# Patient Record
Sex: FEMALE | Race: WHITE | NOT HISPANIC OR LATINO | Employment: UNEMPLOYED | URBAN - NONMETROPOLITAN AREA
[De-identification: names, ages, dates, MRNs, and addresses within clinical notes are randomized per-mention and may not be internally consistent; named-entity substitution may affect disease eponyms.]

---

## 2017-05-05 DIAGNOSIS — M25.512 LEFT SHOULDER PAIN, UNSPECIFIED CHRONICITY: Primary | ICD-10-CM

## 2017-05-08 ENCOUNTER — HOSPITAL ENCOUNTER (OUTPATIENT)
Dept: GENERAL RADIOLOGY | Facility: HOSPITAL | Age: 67
Discharge: HOME | End: 2017-05-08
Attending: ORTHOPAEDIC SURGERY | Admitting: ORTHOPAEDIC SURGERY

## 2017-05-08 ENCOUNTER — OFFICE VISIT (OUTPATIENT)
Dept: ORTHOPEDIC SURGERY | Facility: CLINIC | Age: 67
End: 2017-05-08

## 2017-05-08 VITALS — BODY MASS INDEX: 34.53 KG/M2 | HEIGHT: 67 IN | WEIGHT: 220 LBS

## 2017-05-08 DIAGNOSIS — M25.512 LEFT SHOULDER PAIN, UNSPECIFIED CHRONICITY: Primary | ICD-10-CM

## 2017-05-08 DIAGNOSIS — M25.512 LEFT SHOULDER PAIN, UNSPECIFIED CHRONICITY: ICD-10-CM

## 2017-05-08 PROCEDURE — 99213 OFFICE O/P EST LOW 20 MIN: CPT | Performed by: ORTHOPAEDIC SURGERY

## 2017-05-08 PROCEDURE — 73030 X-RAY EXAM OF SHOULDER: CPT | Performed by: RADIOLOGY

## 2017-05-08 PROCEDURE — 73030 X-RAY EXAM OF SHOULDER: CPT

## 2017-05-08 PROCEDURE — 20610 DRAIN/INJ JOINT/BURSA W/O US: CPT | Performed by: ORTHOPAEDIC SURGERY

## 2017-05-08 RX ORDER — PRAVASTATIN SODIUM 10 MG
TABLET ORAL
Refills: 0 | COMMUNITY
Start: 2017-05-03 | End: 2017-07-25

## 2017-05-08 RX ORDER — LIDOCAINE HYDROCHLORIDE 20 MG/ML
2 INJECTION, SOLUTION INFILTRATION; PERINEURAL
Status: COMPLETED | OUTPATIENT
Start: 2017-05-08 | End: 2017-05-08

## 2017-05-08 RX ORDER — DICLOFENAC SODIUM 75 MG/1
75 TABLET, DELAYED RELEASE ORAL EVERY MORNING
COMMUNITY
Start: 2015-08-03 | End: 2017-07-28 | Stop reason: HOSPADM

## 2017-05-08 RX ORDER — BENAZEPRIL HYDROCHLORIDE 40 MG/1
40 TABLET, FILM COATED ORAL EVERY MORNING
COMMUNITY
Start: 2015-08-03

## 2017-05-08 RX ORDER — AMLODIPINE BESYLATE 5 MG/1
5 TABLET ORAL NIGHTLY
COMMUNITY
Start: 2013-11-04

## 2017-05-08 RX ORDER — ISOSORBIDE MONONITRATE 60 MG/1
60 TABLET, EXTENDED RELEASE ORAL EVERY MORNING
COMMUNITY
Start: 2015-08-03

## 2017-05-08 RX ORDER — METHYLPREDNISOLONE ACETATE 40 MG/ML
40 INJECTION, SUSPENSION INTRA-ARTICULAR; INTRALESIONAL; INTRAMUSCULAR; SOFT TISSUE
Status: COMPLETED | OUTPATIENT
Start: 2017-05-08 | End: 2017-05-08

## 2017-05-08 RX ADMIN — METHYLPREDNISOLONE ACETATE 40 MG: 40 INJECTION, SUSPENSION INTRA-ARTICULAR; INTRALESIONAL; INTRAMUSCULAR; SOFT TISSUE at 14:22

## 2017-05-08 RX ADMIN — LIDOCAINE HYDROCHLORIDE 2 ML: 20 INJECTION, SOLUTION INFILTRATION; PERINEURAL at 14:22

## 2017-07-10 ENCOUNTER — OFFICE VISIT (OUTPATIENT)
Dept: ORTHOPEDIC SURGERY | Facility: CLINIC | Age: 67
End: 2017-07-10

## 2017-07-10 VITALS — WEIGHT: 220 LBS | BODY MASS INDEX: 34.53 KG/M2 | HEIGHT: 67 IN

## 2017-07-10 DIAGNOSIS — M75.112 INCOMPLETE TEAR OF LEFT ROTATOR CUFF: ICD-10-CM

## 2017-07-10 DIAGNOSIS — M75.42 IMPINGEMENT SYNDROME, SHOULDER, LEFT: Primary | ICD-10-CM

## 2017-07-10 PROBLEM — E78.5 HLD (HYPERLIPIDEMIA): Status: ACTIVE | Noted: 2017-07-10

## 2017-07-10 PROBLEM — M10.9 GOUT: Status: ACTIVE | Noted: 2017-07-10

## 2017-07-10 PROBLEM — M81.0 OP (OSTEOPOROSIS): Status: ACTIVE | Noted: 2017-07-10

## 2017-07-10 PROBLEM — I10 BP (HIGH BLOOD PRESSURE): Status: ACTIVE | Noted: 2017-07-10

## 2017-07-10 PROBLEM — M17.9 ARTHRITIS OF KNEE, DEGENERATIVE: Status: ACTIVE | Noted: 2017-07-10

## 2017-07-10 PROCEDURE — 20610 DRAIN/INJ JOINT/BURSA W/O US: CPT | Performed by: ORTHOPAEDIC SURGERY

## 2017-07-10 PROCEDURE — 99214 OFFICE O/P EST MOD 30 MIN: CPT | Performed by: ORTHOPAEDIC SURGERY

## 2017-07-10 RX ORDER — CYCLOBENZAPRINE HCL 5 MG
5 TABLET ORAL NIGHTLY
COMMUNITY

## 2017-07-10 RX ORDER — SODIUM CHLORIDE, SODIUM LACTATE, POTASSIUM CHLORIDE, CALCIUM CHLORIDE 600; 310; 30; 20 MG/100ML; MG/100ML; MG/100ML; MG/100ML
75 INJECTION, SOLUTION INTRAVENOUS CONTINUOUS
Status: CANCELLED | OUTPATIENT
Start: 2017-07-27

## 2017-07-10 RX ORDER — SODIUM CHLORIDE 0.9 % (FLUSH) 0.9 %
1-10 SYRINGE (ML) INJECTION AS NEEDED
Status: CANCELLED | OUTPATIENT
Start: 2017-07-27

## 2017-07-10 RX ADMIN — METHYLPREDNISOLONE ACETATE 40 MG: 40 INJECTION, SUSPENSION INTRA-ARTICULAR; INTRALESIONAL; INTRAMUSCULAR; SOFT TISSUE at 16:11

## 2017-07-10 RX ADMIN — LIDOCAINE HYDROCHLORIDE 2 ML: 20 INJECTION, SOLUTION INFILTRATION; PERINEURAL at 16:11

## 2017-07-10 NOTE — PROGRESS NOTES
History and Physical    Patient: Gayathri Infante  YOB: 1950  Date of encounter: 7/12/2017      History of Present Illness:   Gayathri Infante is a 67 y.o. female who returns here today for follow-up of left shoulder pain.  Her previous office visit she received a subacromial cortisone injection with relief for only about 5 weeks.  She continues to complain of significant pain and decreased range of motion.  She has brought with her the MRI for us to review today.    PMH:   Patient Active Problem List   Diagnosis   • Gout   • HLD (hyperlipidemia)   • BP (high blood pressure)   • Arthritis of knee, degenerative   • OP (osteoporosis)     Past Medical History:   Diagnosis Date   • Gout    • High cholesterol    • Hypertension    • Osteoporosis        PSH:  Past Surgical History:   Procedure Laterality Date   • CHOLECYSTECTOMY     • TOTAL HIP ARTHROPLASTY      both       Allergies:     Allergies   Allergen Reactions   • Sulfa Antibiotics        Medications:     Current Outpatient Prescriptions:   •  amLODIPine (NORVASC) 5 MG tablet, Take  by mouth., Disp: , Rfl:   •  aspirin 81 MG tablet, Take 81 mg by mouth Daily., Disp: , Rfl:   •  benazepril (LOTENSIN) 40 MG tablet, Take  by mouth., Disp: , Rfl:   •  cyclobenzaprine (FLEXERIL) 5 MG tablet, Take 5 mg by mouth At Night As Needed for Muscle Spasms., Disp: , Rfl:   •  diclofenac (VOLTAREN) 75 MG EC tablet, Take  by mouth., Disp: , Rfl:   •  isosorbide mononitrate (IMDUR) 60 MG 24 hr tablet, Take  by mouth., Disp: , Rfl:   •  metoprolol tartrate (LOPRESSOR) 25 MG tablet, Take  by mouth., Disp: , Rfl:   •  pravastatin (PRAVACHOL) 10 MG tablet, take 1 tablet by mouth once daily, Disp: , Rfl: 0    Social History:  Social History     Social History   • Marital status:      Spouse name: N/A   • Number of children: N/A   • Years of education: N/A     Occupational History   • Not on file.     Social History Main Topics   • Smoking status: Former Smoker   •  "Smokeless tobacco: Not on file   • Alcohol use No   • Drug use: Not on file   • Sexual activity: Not on file     Other Topics Concern   • Not on file     Social History Narrative       Family History:     Family History   Problem Relation Age of Onset   • Hypertension Mother    • Heart disease Mother    • Cancer Mother    • Osteoporosis Mother    • Heart disease Father    • Hypertension Father    • Hypertension Sister    • Heart disease Sister    • Hypertension Brother    • Heart disease Brother    • Hypertension Maternal Grandmother    • Heart disease Maternal Grandmother    • Hypertension Maternal Grandfather    • Heart disease Maternal Grandfather    • Osteoporosis Maternal Grandfather        Review of Systems:   Review of Systems - General ROS: negative  Psychological ROS: negative  Ophthalmic ROS: negative  ENT ROS: negative  Allergy and Immunology ROS: negative  Hematological and Lymphatic ROS: negative  Respiratory ROS: positive for - shortness of breath  negative for - cough or wheezing  Cardiovascular ROS: positive for - palpitations  negative for - chest pain or edema  Gastrointestinal ROS: no abdominal pain, change in bowel habits, or black or bloody stools  Genito-Urinary ROS: no dysuria, trouble voiding, or hematuria  Musculoskeletal ROS: negative  Neurological ROS: no TIA or stroke symptoms  Dermatological ROS: negative      Physical Exam:  General Appearance:    67 y.o. female  cooperative, in no acute distress.  Alert and oriented x 3,                   Vitals:    07/10/17 1514   Weight: 220 lb (99.8 kg)   Height: 67\" (170.2 cm)          HEENT: Unremarkable      Neck: Supple without lymphadenopathy.      Chest: Clear to ascultation bilaterally. Normal respiratory effort.      Heart: Regular rate and rhythm. No murmurs noted.      Skin:  Warm and dry without any rash.      Musculoskeletal:Examination of the left shoulder reveals tenderness along the lateral acromion. She has forward flexion to " approximately 90°. Beyond this elicits significant pain and she has difficulty doing it. She has good external and internal rotation. She has significant pain with Job's maneuver and external rotation but strength is intact. Negative speed's test. Neurovascular status is intact.    Radiology:     MRI of the left shoulder was reviewed and reveals a tear of the rotator cuff tendon without retraction evidence of impingement.      Assessment    ICD-10-CM ICD-9-CM   1. Impingement syndrome, shoulder, left M75.42 726.2   2. Incomplete tear of left rotator cuff M75.112 840.4       Plan:  A 67-year-old female with impingement of the left shoulder with a partial tear of the rotator cuff tendon.  She only received about 5 weeks of relief from previous injection.  We were able to review her MRI today and there is evidence of significant impingement with a tear.  We proceed with 40 mg of DepoMedrol with lidocaine block into the subacromial space of the left shoulder. We have discussed proceeding with surgical intervention including arthroscopy of the left shoulder with acromioplasty and possible mini open repair of the rotator cuff tendon and possible excision of the lateral clavicle.  We discussed the risks, benefits, and future outcomes of surgery.  She accepts these risks and is agreeable to surgery.  We'll place her on the OR schedule for July 27, 2017.    Written by, Nayely TOLBERT, acting as a scribe for Dr. Christie    Cc Dr. Peters            Large Joint Arthrocentesis  Date/Time: 7/10/2017 4:11 PM  Consent given by: patient  Timeout: Immediately prior to procedure a time out was called to verify the correct patient, procedure, equipment, support staff and site/side marked as required   Supporting Documentation  Indications: pain   Procedure Details  Location: shoulder - L subacromial bursa  Needle size: 25 G  Approach: lateral  Medications administered: 40 mg methylPREDNISolone acetate 40 MG/ML; 2 mL lidocaine  2%  Patient tolerance: patient tolerated the procedure well with no immediate complications

## 2017-07-11 RX ORDER — METHYLPREDNISOLONE ACETATE 40 MG/ML
40 INJECTION, SUSPENSION INTRA-ARTICULAR; INTRALESIONAL; INTRAMUSCULAR; SOFT TISSUE
Status: COMPLETED | OUTPATIENT
Start: 2017-07-10 | End: 2017-07-10

## 2017-07-11 RX ORDER — LIDOCAINE HYDROCHLORIDE 20 MG/ML
2 INJECTION, SOLUTION INFILTRATION; PERINEURAL
Status: COMPLETED | OUTPATIENT
Start: 2017-07-10 | End: 2017-07-10

## 2017-07-12 ENCOUNTER — PREP FOR SURGERY (OUTPATIENT)
Dept: OTHER | Facility: HOSPITAL | Age: 67
End: 2017-07-12

## 2017-07-12 NOTE — H&P
History and Physical    Patient: Gayathri Infante  YOB: 1950  Date of encounter: 7/12/2017      History of Present Illness:   Gayathri Infante is a 67 y.o. female who returns here today for follow-up of left shoulder pain.  Her previous office visit she received a subacromial cortisone injection with relief for only about 5 weeks.  She continues to complain of significant pain and decreased range of motion.  She has brought with her the MRI for us to review today.    PMH:   Patient Active Problem List   Diagnosis   • Gout   • HLD (hyperlipidemia)   • BP (high blood pressure)   • Arthritis of knee, degenerative   • OP (osteoporosis)     Past Medical History:   Diagnosis Date   • Gout    • High cholesterol    • Hypertension    • Osteoporosis        PSH:  Past Surgical History:   Procedure Laterality Date   • CHOLECYSTECTOMY     • TOTAL HIP ARTHROPLASTY      both       Allergies:     Allergies   Allergen Reactions   • Sulfa Antibiotics        Medications:     Current Outpatient Prescriptions:   •  amLODIPine (NORVASC) 5 MG tablet, Take  by mouth., Disp: , Rfl:   •  aspirin 81 MG tablet, Take 81 mg by mouth Daily., Disp: , Rfl:   •  benazepril (LOTENSIN) 40 MG tablet, Take  by mouth., Disp: , Rfl:   •  cyclobenzaprine (FLEXERIL) 5 MG tablet, Take 5 mg by mouth At Night As Needed for Muscle Spasms., Disp: , Rfl:   •  diclofenac (VOLTAREN) 75 MG EC tablet, Take  by mouth., Disp: , Rfl:   •  isosorbide mononitrate (IMDUR) 60 MG 24 hr tablet, Take  by mouth., Disp: , Rfl:   •  metoprolol tartrate (LOPRESSOR) 25 MG tablet, Take  by mouth., Disp: , Rfl:   •  pravastatin (PRAVACHOL) 10 MG tablet, take 1 tablet by mouth once daily, Disp: , Rfl: 0    Social History:  Social History     Social History   • Marital status:      Spouse name: N/A   • Number of children: N/A   • Years of education: N/A     Occupational History   • Not on file.     Social History Main Topics   • Smoking status: Former Smoker   •  "Smokeless tobacco: Not on file   • Alcohol use No   • Drug use: Not on file   • Sexual activity: Not on file     Other Topics Concern   • Not on file     Social History Narrative       Family History:     Family History   Problem Relation Age of Onset   • Hypertension Mother    • Heart disease Mother    • Cancer Mother    • Osteoporosis Mother    • Heart disease Father    • Hypertension Father    • Hypertension Sister    • Heart disease Sister    • Hypertension Brother    • Heart disease Brother    • Hypertension Maternal Grandmother    • Heart disease Maternal Grandmother    • Hypertension Maternal Grandfather    • Heart disease Maternal Grandfather    • Osteoporosis Maternal Grandfather        Review of Systems:   Review of Systems - General ROS: negative  Psychological ROS: negative  Ophthalmic ROS: negative  ENT ROS: negative  Allergy and Immunology ROS: negative  Hematological and Lymphatic ROS: negative  Respiratory ROS: positive for - shortness of breath  negative for - cough or wheezing  Cardiovascular ROS: positive for - palpitations  negative for - chest pain or edema  Gastrointestinal ROS: no abdominal pain, change in bowel habits, or black or bloody stools  Genito-Urinary ROS: no dysuria, trouble voiding, or hematuria  Musculoskeletal ROS: negative  Neurological ROS: no TIA or stroke symptoms  Dermatological ROS: negative      Physical Exam:  General Appearance:    67 y.o. female  cooperative, in no acute distress.  Alert and oriented x 3,                   Vitals:    07/10/17 1514   Weight: 220 lb (99.8 kg)   Height: 67\" (170.2 cm)          HEENT: Unremarkable      Neck: Supple without lymphadenopathy.      Chest: Clear to ascultation bilaterally. Normal respiratory effort.      Heart: Regular rate and rhythm. No murmurs noted.      Skin:  Warm and dry without any rash.      Musculoskeletal:Examination of the left shoulder reveals tenderness along the lateral acromion. She has forward flexion to " approximately 90°. Beyond this elicits significant pain and she has difficulty doing it. She has good external and internal rotation. She has significant pain with Job's maneuver and external rotation but strength is intact. Negative speed's test. Neurovascular status is intact.    Radiology:     MRI of the left shoulder was reviewed and reveals a tear of the rotator cuff tendon without retraction evidence of impingement.    Large Joint Arthrocentesis  Date/Time: 7/10/2017 4:11 PM  Consent given by: patient  Timeout: Immediately prior to procedure a time out was called to verify the correct patient, procedure, equipment, support staff and site/side marked as required   Supporting Documentation  Indications: pain   Procedure Details  Location: shoulder - L subacromial bursa  Needle size: 25 G  Approach: lateral  Medications administered: 40 mg methylPREDNISolone acetate 40 MG/ML; 2 mL lidocaine 2%  Patient tolerance: patient tolerated the procedure well with no immediate complications    Assessment    ICD-10-CM ICD-9-CM   1. Impingement syndrome, shoulder, left M75.42 726.2   2. Incomplete tear of left rotator cuff M75.112 840.4       Plan:  A 67-year-old female with impingement of the left shoulder with a partial tear of the rotator cuff tendon.  She only received about 5 weeks of relief from previous injection.  We were able to review her MRI today and there is evidence of significant impingement with a tear.  We have discussed proceeding with surgical intervention including arthroscopy of the left shoulder with acromioplasty and possible mini open repair of the rotator cuff tendon and possible excision of the lateral clavicle.  We discussed the risks, benefits, and future outcomes of surgery.  She accepts these risks and is agreeable to surgery.  We'll place her on the OR schedule for July 27, 2017.    Written by, Nayely TOLBERT, acting as a scribe for Dr. Shahnaz Peters    This document was signed by  Julio César Christie M.D.  July 12, 2017 3:56 PM

## 2017-07-14 ENCOUNTER — TELEPHONE (OUTPATIENT)
Dept: ORTHOPEDIC SURGERY | Facility: CLINIC | Age: 67
End: 2017-07-14

## 2017-07-25 ENCOUNTER — APPOINTMENT (OUTPATIENT)
Dept: PREADMISSION TESTING | Facility: HOSPITAL | Age: 67
End: 2017-07-25

## 2017-07-25 LAB
ANION GAP SERPL CALCULATED.3IONS-SCNC: 5.9 MMOL/L (ref 3.6–11.2)
BUN BLD-MCNC: 16 MG/DL (ref 7–21)
BUN/CREAT SERPL: 27.1 (ref 7–25)
CALCIUM SPEC-SCNC: 9 MG/DL (ref 7.7–10)
CHLORIDE SERPL-SCNC: 109 MMOL/L (ref 99–112)
CO2 SERPL-SCNC: 26.1 MMOL/L (ref 24.3–31.9)
CREAT BLD-MCNC: 0.59 MG/DL (ref 0.43–1.29)
DEPRECATED RDW RBC AUTO: 42.9 FL (ref 37–54)
ERYTHROCYTE [DISTWIDTH] IN BLOOD BY AUTOMATED COUNT: 13 % (ref 11.5–14.5)
GFR SERPL CREATININE-BSD FRML MDRD: 102 ML/MIN/1.73
GLUCOSE BLD-MCNC: 81 MG/DL (ref 70–110)
HCT VFR BLD AUTO: 38.8 % (ref 37–47)
HGB BLD-MCNC: 12.7 G/DL (ref 12–16)
MCH RBC QN AUTO: 30.6 PG (ref 27–33)
MCHC RBC AUTO-ENTMCNC: 32.7 G/DL (ref 33–37)
MCV RBC AUTO: 93.5 FL (ref 80–94)
OSMOLALITY SERPL CALC.SUM OF ELEC: 281.5 MOSM/KG (ref 273–305)
PLATELET # BLD AUTO: 281 10*3/MM3 (ref 130–400)
PMV BLD AUTO: 11.1 FL (ref 6–10)
POTASSIUM BLD-SCNC: 4.2 MMOL/L (ref 3.5–5.3)
RBC # BLD AUTO: 4.15 10*6/MM3 (ref 4.2–5.4)
SODIUM BLD-SCNC: 141 MMOL/L (ref 135–153)
WBC NRBC COR # BLD: 7.29 10*3/MM3 (ref 4.5–12.5)

## 2017-07-25 PROCEDURE — 93010 ELECTROCARDIOGRAM REPORT: CPT | Performed by: INTERNAL MEDICINE

## 2017-07-25 RX ORDER — PRAVASTATIN SODIUM 10 MG
10 TABLET ORAL NIGHTLY
COMMUNITY

## 2017-07-25 RX ORDER — CHLORAL HYDRATE 500 MG
1000 CAPSULE ORAL
COMMUNITY

## 2017-07-26 ENCOUNTER — TELEPHONE (OUTPATIENT)
Dept: ORTHOPEDIC SURGERY | Facility: CLINIC | Age: 67
End: 2017-07-26

## 2017-07-27 ENCOUNTER — ANESTHESIA (OUTPATIENT)
Dept: PERIOP | Facility: HOSPITAL | Age: 67
End: 2017-07-27

## 2017-07-27 ENCOUNTER — HOSPITAL ENCOUNTER (INPATIENT)
Facility: HOSPITAL | Age: 67
LOS: 1 days | Discharge: HOME | End: 2017-07-28
Attending: ORTHOPAEDIC SURGERY | Admitting: ORTHOPAEDIC SURGERY

## 2017-07-27 ENCOUNTER — ANESTHESIA EVENT (OUTPATIENT)
Dept: PERIOP | Facility: HOSPITAL | Age: 67
End: 2017-07-27

## 2017-07-27 ENCOUNTER — APPOINTMENT (OUTPATIENT)
Dept: GENERAL RADIOLOGY | Facility: HOSPITAL | Age: 67
End: 2017-07-27
Attending: ORTHOPAEDIC SURGERY

## 2017-07-27 DIAGNOSIS — M75.42 IMPINGEMENT SYNDROME, SHOULDER, LEFT: ICD-10-CM

## 2017-07-27 DIAGNOSIS — M75.112 INCOMPLETE TEAR OF LEFT ROTATOR CUFF: ICD-10-CM

## 2017-07-27 PROBLEM — M75.40 IMPINGEMENT SYNDROME, SHOULDER: Status: ACTIVE | Noted: 2017-07-27

## 2017-07-27 PROCEDURE — 94799 UNLISTED PULMONARY SVC/PX: CPT

## 2017-07-27 PROCEDURE — C1713 ANCHOR/SCREW BN/BN,TIS/BN: HCPCS | Performed by: ORTHOPAEDIC SURGERY

## 2017-07-27 PROCEDURE — 25010000002 PROPOFOL 10 MG/ML EMULSION: Performed by: NURSE ANESTHETIST, CERTIFIED REGISTERED

## 2017-07-27 PROCEDURE — 25010000002 ONDANSETRON PER 1 MG: Performed by: NURSE ANESTHETIST, CERTIFIED REGISTERED

## 2017-07-27 PROCEDURE — 25010000002 FENTANYL CITRATE (PF) 100 MCG/2ML SOLUTION: Performed by: NURSE ANESTHETIST, CERTIFIED REGISTERED

## 2017-07-27 PROCEDURE — 0LQ24ZZ REPAIR LEFT SHOULDER TENDON, PERCUTANEOUS ENDOSCOPIC APPROACH: ICD-10-PCS | Performed by: ORTHOPAEDIC SURGERY

## 2017-07-27 PROCEDURE — 23412 REPAIR ROTATOR CUFF CHRONIC: CPT | Performed by: ORTHOPAEDIC SURGERY

## 2017-07-27 PROCEDURE — 25010000003 CEFAZOLIN PER 500 MG: Performed by: ORTHOPAEDIC SURGERY

## 2017-07-27 PROCEDURE — 0RNK4ZZ RELEASE LEFT SHOULDER JOINT, PERCUTANEOUS ENDOSCOPIC APPROACH: ICD-10-PCS | Performed by: ORTHOPAEDIC SURGERY

## 2017-07-27 DEVICE — SYS SUT/ANCH BIOCOMP SPEEDBRIDGE 4.85 12.5: Type: IMPLANTABLE DEVICE | Site: SHOULDER | Status: FUNCTIONAL

## 2017-07-27 RX ORDER — ATORVASTATIN CALCIUM 10 MG/1
10 TABLET, FILM COATED ORAL NIGHTLY
Status: DISCONTINUED | OUTPATIENT
Start: 2017-07-27 | End: 2017-07-28 | Stop reason: HOSPADM

## 2017-07-27 RX ORDER — OXYCODONE HYDROCHLORIDE AND ACETAMINOPHEN 5; 325 MG/1; MG/1
1 TABLET ORAL ONCE AS NEEDED
Status: DISCONTINUED | OUTPATIENT
Start: 2017-07-27 | End: 2017-07-27 | Stop reason: HOSPADM

## 2017-07-27 RX ORDER — SODIUM CHLORIDE 0.9 % (FLUSH) 0.9 %
1-10 SYRINGE (ML) INJECTION AS NEEDED
Status: DISCONTINUED | OUTPATIENT
Start: 2017-07-27 | End: 2017-07-27 | Stop reason: HOSPADM

## 2017-07-27 RX ORDER — IPRATROPIUM BROMIDE AND ALBUTEROL SULFATE 2.5; .5 MG/3ML; MG/3ML
3 SOLUTION RESPIRATORY (INHALATION) ONCE AS NEEDED
Status: DISCONTINUED | OUTPATIENT
Start: 2017-07-27 | End: 2017-07-27 | Stop reason: HOSPADM

## 2017-07-27 RX ORDER — NAPROXEN 250 MG/1
500 TABLET ORAL 2 TIMES DAILY WITH MEALS
Status: DISCONTINUED | OUTPATIENT
Start: 2017-07-27 | End: 2017-07-28 | Stop reason: HOSPADM

## 2017-07-27 RX ORDER — AMLODIPINE BESYLATE 5 MG/1
5 TABLET ORAL NIGHTLY
Status: DISCONTINUED | OUTPATIENT
Start: 2017-07-27 | End: 2017-07-28 | Stop reason: HOSPADM

## 2017-07-27 RX ORDER — FENTANYL CITRATE 50 UG/ML
50 INJECTION, SOLUTION INTRAMUSCULAR; INTRAVENOUS
Status: DISCONTINUED | OUTPATIENT
Start: 2017-07-27 | End: 2017-07-27 | Stop reason: HOSPADM

## 2017-07-27 RX ORDER — LIDOCAINE HYDROCHLORIDE 20 MG/ML
INJECTION, SOLUTION EPIDURAL; INFILTRATION; INTRACAUDAL; PERINEURAL AS NEEDED
Status: DISCONTINUED | OUTPATIENT
Start: 2017-07-27 | End: 2017-07-27 | Stop reason: SURG

## 2017-07-27 RX ORDER — ONDANSETRON 2 MG/ML
4 INJECTION INTRAMUSCULAR; INTRAVENOUS ONCE AS NEEDED
Status: DISCONTINUED | OUTPATIENT
Start: 2017-07-27 | End: 2017-07-27 | Stop reason: HOSPADM

## 2017-07-27 RX ORDER — MAGNESIUM HYDROXIDE 1200 MG/15ML
LIQUID ORAL AS NEEDED
Status: DISCONTINUED | OUTPATIENT
Start: 2017-07-27 | End: 2017-07-27 | Stop reason: HOSPADM

## 2017-07-27 RX ORDER — SODIUM CHLORIDE, SODIUM LACTATE, POTASSIUM CHLORIDE, CALCIUM CHLORIDE 600; 310; 30; 20 MG/100ML; MG/100ML; MG/100ML; MG/100ML
125 INJECTION, SOLUTION INTRAVENOUS CONTINUOUS
Status: DISCONTINUED | OUTPATIENT
Start: 2017-07-27 | End: 2017-07-28 | Stop reason: HOSPADM

## 2017-07-27 RX ORDER — MIDAZOLAM HYDROCHLORIDE 1 MG/ML
2 INJECTION INTRAMUSCULAR; INTRAVENOUS
Status: DISCONTINUED | OUTPATIENT
Start: 2017-07-27 | End: 2017-07-27 | Stop reason: HOSPADM

## 2017-07-27 RX ORDER — ONDANSETRON 4 MG/1
4 TABLET, ORALLY DISINTEGRATING ORAL EVERY 6 HOURS PRN
Status: DISCONTINUED | OUTPATIENT
Start: 2017-07-27 | End: 2017-07-28 | Stop reason: HOSPADM

## 2017-07-27 RX ORDER — PROPOFOL 10 MG/ML
VIAL (ML) INTRAVENOUS AS NEEDED
Status: DISCONTINUED | OUTPATIENT
Start: 2017-07-27 | End: 2017-07-27 | Stop reason: SURG

## 2017-07-27 RX ORDER — NALOXONE HCL 0.4 MG/ML
0.4 VIAL (ML) INJECTION
Status: DISCONTINUED | OUTPATIENT
Start: 2017-07-27 | End: 2017-07-27 | Stop reason: HOSPADM

## 2017-07-27 RX ORDER — MORPHINE SULFATE 10 MG/ML
8 INJECTION INTRAMUSCULAR; INTRAVENOUS; SUBCUTANEOUS
Status: DISCONTINUED | OUTPATIENT
Start: 2017-07-27 | End: 2017-07-27 | Stop reason: HOSPADM

## 2017-07-27 RX ORDER — CYCLOBENZAPRINE HCL 10 MG
5 TABLET ORAL NIGHTLY
Status: DISCONTINUED | OUTPATIENT
Start: 2017-07-27 | End: 2017-07-28 | Stop reason: HOSPADM

## 2017-07-27 RX ORDER — VECURONIUM BROMIDE 1 MG/ML
INJECTION, POWDER, LYOPHILIZED, FOR SOLUTION INTRAVENOUS AS NEEDED
Status: DISCONTINUED | OUTPATIENT
Start: 2017-07-27 | End: 2017-07-27 | Stop reason: SURG

## 2017-07-27 RX ORDER — SODIUM CHLORIDE, SODIUM LACTATE, POTASSIUM CHLORIDE, CALCIUM CHLORIDE 600; 310; 30; 20 MG/100ML; MG/100ML; MG/100ML; MG/100ML
75 INJECTION, SOLUTION INTRAVENOUS CONTINUOUS
Status: DISCONTINUED | OUTPATIENT
Start: 2017-07-27 | End: 2017-07-28 | Stop reason: HOSPADM

## 2017-07-27 RX ORDER — OXYCODONE HYDROCHLORIDE AND ACETAMINOPHEN 5; 325 MG/1; MG/1
1 TABLET ORAL EVERY 4 HOURS PRN
Status: DISCONTINUED | OUTPATIENT
Start: 2017-07-27 | End: 2017-07-28 | Stop reason: HOSPADM

## 2017-07-27 RX ORDER — ASPIRIN 81 MG/1
81 TABLET ORAL DAILY
Status: DISCONTINUED | OUTPATIENT
Start: 2017-07-27 | End: 2017-07-28 | Stop reason: HOSPADM

## 2017-07-27 RX ORDER — ISOSORBIDE MONONITRATE 60 MG/1
60 TABLET, EXTENDED RELEASE ORAL EVERY MORNING
Status: DISCONTINUED | OUTPATIENT
Start: 2017-07-28 | End: 2017-07-28 | Stop reason: HOSPADM

## 2017-07-27 RX ORDER — OXYCODONE AND ACETAMINOPHEN 7.5; 325 MG/1; MG/1
1 TABLET ORAL EVERY 4 HOURS PRN
Status: DISCONTINUED | OUTPATIENT
Start: 2017-07-27 | End: 2017-07-27 | Stop reason: HOSPADM

## 2017-07-27 RX ORDER — LISINOPRIL 10 MG/1
40 TABLET ORAL
Status: DISCONTINUED | OUTPATIENT
Start: 2017-07-28 | End: 2017-07-28 | Stop reason: HOSPADM

## 2017-07-27 RX ORDER — ONDANSETRON 2 MG/ML
INJECTION INTRAMUSCULAR; INTRAVENOUS AS NEEDED
Status: DISCONTINUED | OUTPATIENT
Start: 2017-07-27 | End: 2017-07-27 | Stop reason: SURG

## 2017-07-27 RX ORDER — MEPERIDINE HYDROCHLORIDE 25 MG/ML
12.5 INJECTION INTRAMUSCULAR; INTRAVENOUS; SUBCUTANEOUS
Status: DISCONTINUED | OUTPATIENT
Start: 2017-07-27 | End: 2017-07-27 | Stop reason: HOSPADM

## 2017-07-27 RX ORDER — MIDAZOLAM HYDROCHLORIDE 1 MG/ML
1 INJECTION INTRAMUSCULAR; INTRAVENOUS
Status: DISCONTINUED | OUTPATIENT
Start: 2017-07-27 | End: 2017-07-27 | Stop reason: HOSPADM

## 2017-07-27 RX ADMIN — ONDANSETRON 4 MG: 2 INJECTION, SOLUTION INTRAMUSCULAR; INTRAVENOUS at 07:30

## 2017-07-27 RX ADMIN — OXYCODONE HYDROCHLORIDE AND ACETAMINOPHEN 1 TABLET: 5; 325 TABLET ORAL at 15:41

## 2017-07-27 RX ADMIN — SODIUM CHLORIDE, POTASSIUM CHLORIDE, SODIUM LACTATE AND CALCIUM CHLORIDE: 600; 310; 30; 20 INJECTION, SOLUTION INTRAVENOUS at 07:30

## 2017-07-27 RX ADMIN — ONDANSETRON 4 MG: 4 TABLET, ORALLY DISINTEGRATING ORAL at 18:23

## 2017-07-27 RX ADMIN — OXYCODONE HYDROCHLORIDE AND ACETAMINOPHEN 1 TABLET: 5; 325 TABLET ORAL at 20:05

## 2017-07-27 RX ADMIN — SODIUM CHLORIDE, POTASSIUM CHLORIDE, SODIUM LACTATE AND CALCIUM CHLORIDE 125 ML/HR: 600; 310; 30; 20 INJECTION, SOLUTION INTRAVENOUS at 07:12

## 2017-07-27 RX ADMIN — SODIUM CHLORIDE, POTASSIUM CHLORIDE, SODIUM LACTATE AND CALCIUM CHLORIDE 75 ML/HR: 600; 310; 30; 20 INJECTION, SOLUTION INTRAVENOUS at 18:28

## 2017-07-27 RX ADMIN — VECURONIUM BROMIDE 5 MG: 1 INJECTION, POWDER, LYOPHILIZED, FOR SOLUTION INTRAVENOUS at 07:37

## 2017-07-27 RX ADMIN — CEFAZOLIN 2 G: 1 INJECTION, POWDER, FOR SOLUTION INTRAMUSCULAR; INTRAVENOUS; PARENTERAL at 07:30

## 2017-07-27 RX ADMIN — AMLODIPINE BESYLATE 5 MG: 5 TABLET ORAL at 20:05

## 2017-07-27 RX ADMIN — FENTANYL CITRATE 50 MCG: 50 INJECTION INTRAMUSCULAR; INTRAVENOUS at 09:31

## 2017-07-27 RX ADMIN — CEFAZOLIN 2 G: 1 INJECTION, POWDER, FOR SOLUTION INTRAMUSCULAR; INTRAVENOUS; PARENTERAL at 15:48

## 2017-07-27 RX ADMIN — EPHEDRINE SULFATE 20 MG: 50 INJECTION INTRAMUSCULAR; INTRAVENOUS; SUBCUTANEOUS at 08:37

## 2017-07-27 RX ADMIN — ASPIRIN 81 MG: 81 TABLET ORAL at 15:49

## 2017-07-27 RX ADMIN — FENTANYL CITRATE 50 MCG: 50 INJECTION INTRAMUSCULAR; INTRAVENOUS at 09:36

## 2017-07-27 RX ADMIN — ATORVASTATIN CALCIUM 10 MG: 10 TABLET, FILM COATED ORAL at 20:04

## 2017-07-27 RX ADMIN — CYCLOBENZAPRINE HYDROCHLORIDE 5 MG: 10 TABLET, FILM COATED ORAL at 20:04

## 2017-07-27 RX ADMIN — LIDOCAINE HYDROCHLORIDE 100 MG: 20 INJECTION, SOLUTION EPIDURAL; INFILTRATION; INTRACAUDAL; PERINEURAL at 07:37

## 2017-07-27 RX ADMIN — NAPROXEN 500 MG: 250 TABLET ORAL at 17:22

## 2017-07-27 RX ADMIN — OXYCODONE HYDROCHLORIDE AND ACETAMINOPHEN 1 TABLET: 5; 325 TABLET ORAL at 11:31

## 2017-07-27 RX ADMIN — SODIUM CHLORIDE, POTASSIUM CHLORIDE, SODIUM LACTATE AND CALCIUM CHLORIDE: 600; 310; 30; 20 INJECTION, SOLUTION INTRAVENOUS at 08:47

## 2017-07-27 RX ADMIN — METOPROLOL TARTRATE 12.5 MG: 25 TABLET, FILM COATED ORAL at 20:01

## 2017-07-27 RX ADMIN — PROPOFOL 150 MG: 10 INJECTION, EMULSION INTRAVENOUS at 07:37

## 2017-07-27 NOTE — ANESTHESIA PROCEDURE NOTES
Peripheral Block    Patient location during procedure: holding area  Start time: 7/27/2017 7:25 AM  Stop time: 7/27/2017 7:28 AM  Reason for block: procedure for pain and post-op pain management  Performed by  Anesthesiologist: LONG ARREOLA  Preanesthetic Checklist  Completed: patient identified, site marked, surgical consent, pre-op evaluation, timeout performed, IV checked, risks and benefits discussed and monitors and equipment checked  Prep:  Sterile barriers:gloves and mask  Prep: ChloraPrep and alcohol swabs  Patient monitoring: blood pressure monitoring, continuous pulse oximetry and EKG  Procedure  Sedation:yes  Guidance:nerve stimulator and landmark technique  Images:still images not obtained  Loss of twitch: 0.5 mA  Laterality:left  Block Type:interscalene  Injection Technique:single-shot  Needle Type:short-bevel  Needle Gauge:22 G    Medications  Local Injected:ropivacaine 0.5% without epinephrine Local Amount Injected:30mL  Post Assessment  Injection Assessment: negative aspiration for heme, no paresthesia on injection and incremental injection  Patient Tolerance:comfortable throughout block  Complications:no

## 2017-07-27 NOTE — ANESTHESIA POSTPROCEDURE EVALUATION
Patient: Gayathri Infante    Procedure Summary     Date Anesthesia Start Anesthesia Stop Room / Location    07/27/17 0730 0922  [facility] / [facility]       Procedure Diagnosis Surgeon Provider    LEFT SHOULDER ARTHROSCOPY W/ MINI OPEN ROTATOR CUFF REPAIR,ACROMIOPLAST (Left Shoulder) Incomplete tear of left rotator cuff; Impingement syndrome, shoulder, left  (Incomplete tear of left rotator cuff [M75.112]; Impingement syndrome, shoulder, left [M75.42]) MD Brando Diaz MD          Anesthesia Type: general, regional  Last vitals  BP   154/82 (07/27/17 1001)    Temp   97 °F (36.1 °C) (07/27/17 0924)    Pulse   65 (07/27/17 1001)   Resp   13 (07/27/17 1001)    SpO2   99 % (07/27/17 1001)      Post Anesthesia Care and Evaluation    Patient location during evaluation: PACU  Patient participation: complete - patient participated  Level of consciousness: awake and alert  Pain score: 1  Pain management: adequate  Airway patency: patent  Anesthetic complications: No anesthetic complications  PONV Status: controlled  Cardiovascular status: acceptable  Respiratory status: acceptable  Hydration status: acceptable

## 2017-07-27 NOTE — ANESTHESIA PROCEDURE NOTES
Airway  Urgency: elective    Date/Time: 7/27/2017 7:30 AM  Airway not difficult    General Information and Staff    Patient location during procedure: OR  Anesthesiologist: LONG ARREOLA  CRNA: INDIGO [facility]IEGA    Indications and Patient Condition  Indications for airway management: airway protection    Preoxygenated: yes  MILS not maintained throughout  Mask difficulty assessment: 0 - not attempted    Final Airway Details  Final airway type: endotracheal airway      Successful airway: ETT  Cuffed: yes   Successful intubation technique: direct laryngoscopy  Endotracheal tube insertion site: oral  Blade: Ruiz  Blade size: #2  ETT size: 7.5 mm  Cormack-Lehane Classification: grade I - full view of glottis  Placement verified by: chest auscultation and capnometry   Measured from: lips  ETT to lips (cm): 22  Number of attempts at approach: 1    Additional Comments  Atraumatic ETT placement, dentition unchanged.

## 2017-07-27 NOTE — ANESTHESIA PREPROCEDURE EVALUATION
Anesthesia Evaluation     no history of anesthetic complications:  NPO Solid Status: > 8 hours  NPO Liquid Status: > 8 hours     Airway   Mallampati: II  TM distance: >3 FB  Neck ROM: full  no difficulty expected  Dental    (+) upper dentures    Pulmonary - normal exam   Cardiovascular - normal exam    (+) hypertension, hyperlipidemia      Neuro/Psych  GI/Hepatic/Renal/Endo    (+) obesity,      Musculoskeletal     Abdominal  - normal exam   Substance History      OB/GYN          Other   (+) arthritis                                     Anesthesia Plan    ASA 3     general and regional     intravenous induction   Anesthetic plan and risks discussed with patient.

## 2017-07-28 VITALS
HEIGHT: 67 IN | OXYGEN SATURATION: 95 % | HEART RATE: 65 BPM | BODY MASS INDEX: 34.11 KG/M2 | TEMPERATURE: 98 F | DIASTOLIC BLOOD PRESSURE: 68 MMHG | WEIGHT: 217.3 LBS | SYSTOLIC BLOOD PRESSURE: 138 MMHG | RESPIRATION RATE: 18 BRPM

## 2017-07-28 PROCEDURE — 99024 POSTOP FOLLOW-UP VISIT: CPT | Performed by: PHYSICIAN ASSISTANT

## 2017-07-28 PROCEDURE — 25010000003 CEFAZOLIN PER 500 MG: Performed by: ORTHOPAEDIC SURGERY

## 2017-07-28 RX ADMIN — OXYCODONE HYDROCHLORIDE AND ACETAMINOPHEN 1 TABLET: 5; 325 TABLET ORAL at 00:56

## 2017-07-28 RX ADMIN — OXYCODONE HYDROCHLORIDE AND ACETAMINOPHEN 1 TABLET: 5; 325 TABLET ORAL at 09:34

## 2017-07-28 RX ADMIN — ASPIRIN 81 MG: 81 TABLET ORAL at 08:40

## 2017-07-28 RX ADMIN — NAPROXEN 500 MG: 250 TABLET ORAL at 08:40

## 2017-07-28 RX ADMIN — OXYCODONE HYDROCHLORIDE AND ACETAMINOPHEN 1 TABLET: 5; 325 TABLET ORAL at 06:00

## 2017-07-28 RX ADMIN — SODIUM CHLORIDE, POTASSIUM CHLORIDE, SODIUM LACTATE AND CALCIUM CHLORIDE 75 ML/HR: 600; 310; 30; 20 INJECTION, SOLUTION INTRAVENOUS at 08:44

## 2017-07-28 RX ADMIN — ISOSORBIDE MONONITRATE 60 MG: 60 TABLET, EXTENDED RELEASE ORAL at 06:01

## 2017-07-28 RX ADMIN — CEFAZOLIN 2 G: 1 INJECTION, POWDER, FOR SOLUTION INTRAMUSCULAR; INTRAVENOUS; PARENTERAL at 08:39

## 2017-07-28 RX ADMIN — METOPROLOL TARTRATE 12.5 MG: 25 TABLET, FILM COATED ORAL at 08:40

## 2017-07-28 RX ADMIN — CEFAZOLIN 2 G: 1 INJECTION, POWDER, FOR SOLUTION INTRAMUSCULAR; INTRAVENOUS; PARENTERAL at 00:32

## 2017-07-28 RX ADMIN — LISINOPRIL 40 MG: 10 TABLET ORAL at 08:39

## 2017-08-08 DIAGNOSIS — Z98.890 S/P SHOULDER SURGERY: Primary | ICD-10-CM

## 2017-08-09 ENCOUNTER — HOSPITAL ENCOUNTER (OUTPATIENT)
Dept: GENERAL RADIOLOGY | Facility: HOSPITAL | Age: 67
Discharge: HOME | End: 2017-08-09
Attending: ORTHOPAEDIC SURGERY

## 2017-08-09 ENCOUNTER — OFFICE VISIT (OUTPATIENT)
Dept: ORTHOPEDIC SURGERY | Facility: CLINIC | Age: 67
End: 2017-08-09

## 2017-08-09 VITALS — HEIGHT: 67 IN | BODY MASS INDEX: 34.06 KG/M2 | WEIGHT: 217 LBS

## 2017-08-09 DIAGNOSIS — Z98.890 S/P SHOULDER SURGERY: ICD-10-CM

## 2017-08-09 DIAGNOSIS — M75.112 INCOMPLETE TEAR OF LEFT ROTATOR CUFF: ICD-10-CM

## 2017-08-09 DIAGNOSIS — M75.42 IMPINGEMENT SYNDROME, SHOULDER, LEFT: ICD-10-CM

## 2017-08-09 DIAGNOSIS — Z98.890 S/P SHOULDER SURGERY: Primary | ICD-10-CM

## 2017-08-09 PROCEDURE — 99024 POSTOP FOLLOW-UP VISIT: CPT | Performed by: ORTHOPAEDIC SURGERY

## 2017-08-09 NOTE — PROGRESS NOTES
Gayathri Infante   :1950    Date of encounter:2017        HPI:  Gayathri Infante is a 67 y.o. female who returns here today status post left shoulder arthroscopy with mini open repair of the rotator cuff tendon and acromioplasty.  Date of surgery was 2017.  She is now 2 weeks postop.  She states that she's doing reasonably well with very little complaints of pain.  She states is severe pain to his having prior to surgery has subsided.  She does have some mild pain typically at night but overall doing well.  She denies paresthesias.    PMH:   Patient Active Problem List   Diagnosis   • Gout   • HLD (hyperlipidemia)   • BP (high blood pressure)   • Arthritis of knee, degenerative   • OP (osteoporosis)   • Impingement syndrome, shoulder         Exam:   On examination her incisions look good without signs of erythema or drainage.  Her neurovascular status is intact.        Assessment    ICD-10-CM ICD-9-CM   1. S/P shoulder surgery Z98.890 V45.89   2. Impingement syndrome, shoulder, left M75.42 726.2   3. Incomplete tear of left rotator cuff M75.112 840.4       Plan:   A 67-year-old female 2 weeks status post left shoulder arthroscopy with acromioplasty and mini open repair of the rotator cuff tendon.  Her incisions look good today without signs of infection.  Staples were removed.  She is to continue her sling.  We'll get her started in formal physical therapy per protocol.  She'll return back for follow-up in 5 weeks.  She was also given Norco 7.5/325 #24 with no refills for pain.    Written by, Nayely TOLBERT, acting as a scribe for Dr. Shahnaz Peters

## 2017-09-20 ENCOUNTER — OFFICE VISIT (OUTPATIENT)
Dept: ORTHOPEDIC SURGERY | Facility: CLINIC | Age: 67
End: 2017-09-20

## 2017-09-20 VITALS — BODY MASS INDEX: 33.43 KG/M2 | WEIGHT: 213 LBS | HEIGHT: 67 IN

## 2017-09-20 DIAGNOSIS — M75.112 INCOMPLETE TEAR OF LEFT ROTATOR CUFF: ICD-10-CM

## 2017-09-20 DIAGNOSIS — M75.42 IMPINGEMENT SYNDROME, SHOULDER, LEFT: Primary | ICD-10-CM

## 2017-09-20 PROCEDURE — 99024 POSTOP FOLLOW-UP VISIT: CPT | Performed by: ORTHOPAEDIC SURGERY

## 2017-09-20 NOTE — PROGRESS NOTES
"Gayathri Infante   :1950    Date of encounter:2017        HPI:  Gayathri Infante is a 67 y.o. female who returns here today status post left shoulder arthroscopy with acromioplasty and mini open repair of the rotator cuff tendon.  Date of surgery was 17.  She is now 8 weeks postop.  She still complaining of constant daily pain.  She states it's not as severe as it was prior to surgery but is still present.  She's been participating in physical therapy but continues to struggle with stiffness and increased pain.    PMH:   Patient Active Problem List   Diagnosis   • Gout   • HLD (hyperlipidemia)   • BP (high blood pressure)   • Arthritis of knee, degenerative   • OP (osteoporosis)   • Impingement syndrome, shoulder       Exam:  General Appearance:    67 y.o. female  cooperative, in no acute distress.  Alert and oriented x 3,                   Vitals:    17 0814   Weight: 213 lb (96.6 kg)   Height: 67\" (170.2 cm)       Examination of the left shoulder reveals a well-healed incision.  She has forward flexion to about 80°.  She can internally rotate to about the belt line.  She still has weakness with Job's maneuver and external rotation.  Her neurovascular status is intact.      Assessment    ICD-10-CM ICD-9-CM   1. Impingement syndrome, shoulder, left M75.42 726.2   2. Incomplete tear of left rotator cuff M75.112 840.4       Plan:    Still has stiffness and constant pain.  Continue to feel that therapy should be continued to help try to improve her range of motion.  She was provided today with Norco 7.5 #40 for pain.  She'll return back in 6 weeks for reevaluation.    Written by, Nayely TOLBERT, acting as a scribe for Dr. Shahnaz Peters                 "

## 2017-10-11 ENCOUNTER — OFFICE VISIT (OUTPATIENT)
Dept: ORTHOPEDIC SURGERY | Facility: CLINIC | Age: 67
End: 2017-10-11

## 2017-10-11 VITALS — BODY MASS INDEX: 33.43 KG/M2 | HEIGHT: 67 IN | WEIGHT: 213 LBS

## 2017-10-11 DIAGNOSIS — M75.112 INCOMPLETE TEAR OF LEFT ROTATOR CUFF: Primary | ICD-10-CM

## 2017-10-11 DIAGNOSIS — M75.42 IMPINGEMENT SYNDROME, SHOULDER, LEFT: ICD-10-CM

## 2017-10-11 PROCEDURE — 99024 POSTOP FOLLOW-UP VISIT: CPT | Performed by: ORTHOPAEDIC SURGERY

## 2017-10-11 RX ORDER — HYDROCODONE BITARTRATE AND ACETAMINOPHEN 7.5; 325 MG/1; MG/1
TABLET ORAL
Refills: 0 | COMMUNITY
Start: 2017-09-20 | End: 2017-11-29

## 2017-10-12 NOTE — PROGRESS NOTES
"Gayathri Infante   :1950    Date of encounter:10/11/2017        HPI:  Gayathri Infante is a 67 y.o. female who presents here today earlier than scheduled appointment for intractable left shoulder pain.  She is 2 months status post left shoulder arthroscopy with mini open repair of the rotator cuff tendon.  She denies any new injuries.  She states that she has been in physical therapy since her last visit but does not recall any incident at therapy that would cause increased pain.  She is complaining of unbearable constant pain.  She states she cannot get any relief for rest from the pain.   PMH:   Patient Active Problem List   Diagnosis   • Gout   • HLD (hyperlipidemia)   • BP (high blood pressure)   • Arthritis of knee, degenerative   • OP (osteoporosis)   • Impingement syndrome, shoulder       Exam:  General Appearance:    67 y.o. female  cooperative, in no acute distress.  Alert and oriented x 3,                   Vitals:    10/11/17 1520   Weight: 213 lb (96.6 kg)   Height: 67\" (170.2 cm)       Examination left shoulder reveals a well-healed incision.  There is no swelling or ecchymosis.  She has forward flexion and abduction to approximately 80°.  She has little external rotation and can internally rotate to approximately L4 compared to T7 on the contralateral side.  Her neurovascular status is intact.        Assessment    ICD-10-CM ICD-9-CM   1. Incomplete tear of left rotator cuff M75.112 840.4   2. Impingement syndrome, shoulder, left M75.42 726.2       Plan:   A 67-year-old female 2-1/2 months status post left shoulder arthroscopy with mini open repair of the Rotator cuff tendon.  On examination her rotator cuff tendon appears to be intact however she has significant stiffness and we are concerned for component of adhesive capsulitis.  We attempted an intra-articular steroid injection into the glenohumeral joint however we had to stop injection secondary to significant pain.  We have advised to hold off " on physical therapy for the next week and we would like to reevaluate her in a week.    Written by, Nayely TOLBERT, acting as a scribe for Dr. Shahnaz Peters

## 2017-10-18 ENCOUNTER — OFFICE VISIT (OUTPATIENT)
Dept: ORTHOPEDIC SURGERY | Facility: CLINIC | Age: 67
End: 2017-10-18

## 2017-10-18 VITALS — BODY MASS INDEX: 33.43 KG/M2 | WEIGHT: 213 LBS | HEIGHT: 67 IN

## 2017-10-18 DIAGNOSIS — Z98.890 S/P SHOULDER SURGERY: Primary | ICD-10-CM

## 2017-10-18 DIAGNOSIS — M76.892 ADHESIVE CAPSULITIS OF LEFT KNEE: ICD-10-CM

## 2017-10-18 PROCEDURE — 99024 POSTOP FOLLOW-UP VISIT: CPT | Performed by: ORTHOPAEDIC SURGERY

## 2017-10-18 NOTE — PATIENT INSTRUCTIONS
Adhesive Capsulitis  Adhesive capsulitis is inflammation of the tendons and ligaments that surround the shoulder joint (shoulder capsule). This condition causes the shoulder to become stiff and painful to move. Adhesive capsulitis is also called frozen shoulder.  CAUSES  This condition may be caused by:  · An injury to the shoulder joint.  · Straining the shoulder.  · Not moving the shoulder for a period of time. This can happen if your arm was injured or in a sling.  · Long-standing health problems, such as:    Diabetes.    Thyroid problems.    Heart disease.    Stroke.    Rheumatoid arthritis.    Lung disease.  In some cases, the cause may not be known.  RISK FACTORS  This condition is more likely to develop in:  · Women.  · People who are older than 40 years of age.  SYMPTOMS  Symptoms of this condition include:  · Pain in the shoulder when moving the arm. There may also be pain when parts of the shoulder are touched. The pain is worse at night or when at rest.  · Soreness or aching in the shoulder.  · Inability to move the shoulder normally.  · Muscle spasms.  DIAGNOSIS  This condition is diagnosed with a physical exam and imaging tests, such as an X-ray or MRI.  TREATMENT  This condition may be treated with:  · Treatment of the underlying cause or condition.  · Physical therapy. This involves performing exercises to get the shoulder moving again.  · Medicine. Medicine may be given to relieve pain, inflammation, or muscle spasms.  · Steroid injections into the shoulder joint.  · Shoulder manipulation. This is a procedure to move the shoulder into another position. It is done after you are given a medicine to make you fall asleep (general anesthetic). The joint may also be injected with salt water at high pressure to break down scarring.  · Surgery. This may be done in severe cases when other treatments have failed.  Although most people recover completely from adhesive capsulitis, some may not regain the full  movement of the shoulder.  HOME CARE INSTRUCTIONS  · Take over-the-counter and prescription medicines only as told by your health care provider.  · If you are being treated with physical therapy, follow instructions from your physical therapist.  · Avoid exercises that put a lot of demand on your shoulder, such as throwing. These exercises can make pain worse.  · If directed, apply ice to the injured area:    Put ice in a plastic bag.    Place a towel between your skin and the bag.    Leave the ice on for 20 minutes, 2-3 times per day.  SEEK MEDICAL CARE IF:  · You develop new symptoms.  · Your symptoms get worse.     This information is not intended to replace advice given to you by your health care provider. Make sure you discuss any questions you have with your health care provider.     Document Released: 10/15/2010 Document Revised: 09/07/2016 Document Reviewed: 04/11/2016  Elsevier Interactive Patient Education ©2017 [employer]

## 2017-10-18 NOTE — PROGRESS NOTES
Patient: Gayathri Infante  YOB: 1950  Date of Encounter: 10/18/2017        History of Present Illness:       Gayathri Infante is a 67 y.o. female who presents today for follow-up evaluation.  She is now greater than 3 months status post left shoulder arthroscopy with mini open rotator cuff repair and acromioplasty. she presented to the office 10/11/17 with significant pain in which she is unable to continue with therapy.  She complained of unbearable constant pain there was attempted intra-articular glenohumeral joint injection however the patient was unable tolerate this.  She was discontinued from her therapy and has been resting the shoulder since last office visit.  She reports may be some improvement of the constant dull throbbing aching pain however she continues to remain stiff.                                                                                                                                                                                                                                                                                                                                                                                                                                                                                                                                                                                                   Social History     Occupational History   • Not on file.     Social History Main Topics   • Smoking status: Former Smoker     Packs/day: 1.00     Years: 30.00     Types: Cigarettes     Quit date: 7/25/1999   • Smokeless tobacco: Never Used   • Alcohol use No   • Drug use: No   • Sexual activity: Defer       Physical Exam: 67 y.o. female .  General Appearance:    Well appearing, cooperative, in no acute distress.       Patient is alert and oriented x 3.          Musculoskeletal: Left shoulder examination today reveals incision well-healed.  There is no  significant swelling or ecchymosis.  Patient exhibits passive and active forward flexion to 85-90° she has limited external rotation to 0° internal rotation to the iliac crest.  She does have oppositional strength upon stressing the supraspinatus.  Neurovascular status grossly intact        Assessment:    ICD-10-CM ICD-9-CM   1. S/P shoulder surgery Z98.890 V45.89   2. Adhesive capsulitis of left knee M76.892 726.69       Plan:    Patient will begin early and tolerable range of motion excercises. She was informted that adhesive capsulitis may very often take up to one year. We would like to forego intra-articular injection secondary to postsurgical status. She may continue pulley system, As well as at home strengthening and range of motion exercises.  We will forego formal outpatient therapy at this time until her pain decreases.  Patient was encouraged that there continues to be evidence of integrity from the previous rotator cuff repair.  Follow-up in 6 weeks for repeat evaluation         Discussion and Summary:    Written by Tremayne Talbot PA-C, acting as scribe for Dr. Christie    CC: Catarino Peters MD

## 2017-11-29 ENCOUNTER — OFFICE VISIT (OUTPATIENT)
Dept: ORTHOPEDIC SURGERY | Facility: CLINIC | Age: 67
End: 2017-11-29

## 2017-11-29 VITALS — HEIGHT: 67 IN | WEIGHT: 216 LBS | BODY MASS INDEX: 33.9 KG/M2

## 2017-11-29 DIAGNOSIS — Z98.890 S/P SHOULDER SURGERY: Primary | ICD-10-CM

## 2017-11-29 DIAGNOSIS — M75.02 ADHESIVE CAPSULITIS OF LEFT SHOULDER: ICD-10-CM

## 2017-11-29 PROCEDURE — 20610 DRAIN/INJ JOINT/BURSA W/O US: CPT | Performed by: ORTHOPAEDIC SURGERY

## 2017-11-29 RX ORDER — METHYLPREDNISOLONE ACETATE 40 MG/ML
40 INJECTION, SUSPENSION INTRA-ARTICULAR; INTRALESIONAL; INTRAMUSCULAR; SOFT TISSUE
Status: COMPLETED | OUTPATIENT
Start: 2017-11-29 | End: 2017-11-29

## 2017-11-29 RX ORDER — LIDOCAINE HYDROCHLORIDE 20 MG/ML
2 INJECTION, SOLUTION INFILTRATION; PERINEURAL
Status: COMPLETED | OUTPATIENT
Start: 2017-11-29 | End: 2017-11-29

## 2017-11-29 RX ORDER — MELATONIN
1000 DAILY
COMMUNITY

## 2017-11-29 RX ORDER — DICLOFENAC SODIUM 75 MG/1
TABLET, DELAYED RELEASE ORAL
Refills: 0 | COMMUNITY
Start: 2017-11-03

## 2017-11-29 RX ADMIN — LIDOCAINE HYDROCHLORIDE 2 ML: 20 INJECTION, SOLUTION INFILTRATION; PERINEURAL at 09:26

## 2017-11-29 RX ADMIN — METHYLPREDNISOLONE ACETATE 40 MG: 40 INJECTION, SUSPENSION INTRA-ARTICULAR; INTRALESIONAL; INTRAMUSCULAR; SOFT TISSUE at 09:26

## 2017-11-29 NOTE — PROGRESS NOTES
Patient: Gayathri Infante  YOB: 1950  Date of Encounter: 11/29/2017        History of Present Illness:     Gayathri Infante is a 67 y.o. female seen today for follow-up she is now 4 months status post arthroscopy left shoulder with mini open rotator cuff repair and acromioplasty.  She has postoperative developmental adhesive capsulitis of left shoulder.  She lacks presented to the office 10/18/17.  She continues to complain of constant dull throbbing aching pain with progressive stiffness.  She began physical therapy with range of motion exercises as deemed appropriate.  No new changes.    Social History     Occupational History   • Not on file.     Social History Main Topics   • Smoking status: Former Smoker     Packs/day: 1.00     Years: 30.00     Types: Cigarettes     Quit date: 7/25/1999   • Smokeless tobacco: Never Used   • Alcohol use No   • Drug use: No   • Sexual activity: Defer       Physical Exam: 67 y.o. female .  General Appearance:    Well appearing, cooperative, in no acute distress.       Patient is alert and oriented x 3.          Musculoskeletal: Examination today of the patient's left shoulder reveals 0° internal rotation to the iliac crest.  Forward flexion to approximately 75-80° with limited external rotation to neutral.  Neurovascular status remains grossly intact.  There continues to elicit integrity of the rotator cuff tendons with oppositional strength upon stressing supraspinatus.      Left shoulder intra-articular Depo-Medrol injection  Date/Time: 11/29/2017 9:26 AM  Consent given by: patient  Site marked: site marked  Supporting Documentation  Indications: pain and diagnostic evaluation   Procedure Details  Location: shoulder - L glenohumeral  Needle size: 25 G (Spinal)  Approach: lateral  Medications administered: 2 mL lidocaine 2%; 40 mg methylPREDNISolone acetate 40 MG/ML  Patient tolerance: patient tolerated the procedure well with no immediate  complications            Assessment:    ICD-10-CM ICD-9-CM   1. S/P shoulder surgery Z98.890 V45.89   2. Adhesive capsulitis of left shoulder M75.02 726.0       Plan:    Patient tolerated the injection well and was instructed to return back if no significant response after 3-4 weeks as well as continuation of therapy.  Patient will continue working on active assisted and active range of motion as tolerated.  She was informed that often times adhesive capsulitis can take up to 12 months for for resolution.  We'll continue monitoring for any significant changes.          Discussion and Summary:    Written by Tremayne Talbot PA-C, acting as scribe for Dr. Christie    CC: Catarino Peters MD

## 2018-03-12 ENCOUNTER — OFFICE VISIT (OUTPATIENT)
Dept: ORTHOPEDIC SURGERY | Facility: CLINIC | Age: 68
End: 2018-03-12

## 2018-03-12 VITALS — WEIGHT: 211 LBS | HEIGHT: 67 IN | BODY MASS INDEX: 33.12 KG/M2

## 2018-03-12 DIAGNOSIS — Z98.890 S/P SHOULDER SURGERY: ICD-10-CM

## 2018-03-12 DIAGNOSIS — M75.02 ADHESIVE CAPSULITIS OF LEFT SHOULDER: Primary | ICD-10-CM

## 2018-03-12 PROCEDURE — 99213 OFFICE O/P EST LOW 20 MIN: CPT | Performed by: ORTHOPAEDIC SURGERY

## 2018-03-12 NOTE — PROGRESS NOTES
"Gayathri Infante   :1950    Date of encounter:2018        HPI:  Gayathri Infante is a 67 y.o. female who is 7 months status post left shoulder arthroscopy with open repair of the rotator cuff tendon and acromioplasty.  She developed adhesive capsulitis secondary to this.  She states that she was participating in physical therapy but has been continuing with exercises on her own at home.  She states she's not been able to make it to them during the winter months has been doing her exercises.  She states that pain is improved but still has significant stiffness and difficulty moving the arm.  She states she does not she's making some progress with her range of motion with that it is going slowly.  She denies paresthesias.    PMH:   Patient Active Problem List   Diagnosis   • Gout   • HLD (hyperlipidemia)   • BP (high blood pressure)   • Arthritis of knee, degenerative   • OP (osteoporosis)   • Impingement syndrome, shoulder       Exam:  General Appearance:    67 y.o. female  cooperative, in no acute distress.  Alert and oriented x 3,                   Vitals:    18 0815   Weight: 95.7 kg (211 lb)   Height: 170.2 cm (67.01\")          Body mass index is 33.04 kg/m².   Examination the left shoulder reveals flexion and abduction to about 90°.  She has about 45° external rotation and she can internally rotate to L5 .  She has good strength.  Her incisions well-healed.  Her neurovascular status is intact      Assessment    ICD-10-CM ICD-9-CM   1. Adhesive capsulitis of left shoulder M75.02 726.0   2. S/P shoulder surgery Z98.890 V45.89       Plan:   A 67-year-old female 7 months status post left shoulder arthroscopy with open repair of the rotator cuff tendon and acromioplasty with development of adhesive capsulitis.  She is improving with no further pain.  She still continues to have difficulty with range of motion.  She states she is able to get back into formal physical therapy so we will get her restarted " and outpatient therapy over the next 6 weeks especially working on her range of motion and strengthening.  We'll have her return back for reevaluation in 8 weeks.    Written by, Nayely TOLBERT, acting as a scribe for Dr. Christie

## 2018-05-07 ENCOUNTER — OFFICE VISIT (OUTPATIENT)
Dept: ORTHOPEDIC SURGERY | Facility: CLINIC | Age: 68
End: 2018-05-07

## 2018-05-07 DIAGNOSIS — M75.02 ADHESIVE CAPSULITIS OF LEFT SHOULDER: Primary | ICD-10-CM

## 2018-05-07 PROCEDURE — 99213 OFFICE O/P EST LOW 20 MIN: CPT | Performed by: ORTHOPAEDIC SURGERY

## 2018-06-12 DIAGNOSIS — M25.551 HIP PAIN, RIGHT: Primary | ICD-10-CM

## 2018-06-13 ENCOUNTER — OFFICE VISIT (OUTPATIENT)
Dept: ORTHOPEDIC SURGERY | Facility: CLINIC | Age: 68
End: 2018-06-13

## 2018-06-13 ENCOUNTER — HOSPITAL ENCOUNTER (OUTPATIENT)
Dept: GENERAL RADIOLOGY | Facility: HOSPITAL | Age: 68
Discharge: HOME | End: 2018-06-13
Attending: ORTHOPAEDIC SURGERY | Admitting: ORTHOPAEDIC SURGERY

## 2018-06-13 VITALS — WEIGHT: 214 LBS | HEIGHT: 67 IN | BODY MASS INDEX: 33.59 KG/M2

## 2018-06-13 DIAGNOSIS — M70.61 TROCHANTERIC BURSITIS OF RIGHT HIP: Primary | ICD-10-CM

## 2018-06-13 DIAGNOSIS — M25.551 HIP PAIN, RIGHT: ICD-10-CM

## 2018-06-13 PROCEDURE — 73502 X-RAY EXAM HIP UNI 2-3 VIEWS: CPT

## 2018-06-13 PROCEDURE — 20610 DRAIN/INJ JOINT/BURSA W/O US: CPT | Performed by: ORTHOPAEDIC SURGERY

## 2018-06-13 PROCEDURE — 73502 X-RAY EXAM HIP UNI 2-3 VIEWS: CPT | Performed by: RADIOLOGY

## 2018-06-13 RX ADMIN — METHYLPREDNISOLONE ACETATE 40 MG: 40 INJECTION, SUSPENSION INTRA-ARTICULAR; INTRALESIONAL; INTRAMUSCULAR; SOFT TISSUE at 11:18

## 2018-06-13 RX ADMIN — LIDOCAINE HYDROCHLORIDE 2 ML: 20 INJECTION, SOLUTION INFILTRATION; PERINEURAL at 11:18

## 2018-06-13 NOTE — PROGRESS NOTES
"Gayathri Infante   :1950    Date of encounter:2018        HPI:  Gayathri Infante is a 68 y.o. female who presents here today with complaints of right hip pain.  She is status post right total hip arthroplasty done in 2009.  She states she's been doing well up until about 2 months ago.  She's complaining of lateral hip pain that it's worse with prolonged sitting or standing, however it is most painful at night when trying to lay on the right side.  She states she did have a fall last week and landed on the right side but this pain was present prior to the fall.  She denies paresthesias.    PMH:   Patient Active Problem List   Diagnosis   • Gout   • HLD (hyperlipidemia)   • BP (high blood pressure)   • Arthritis of knee, degenerative   • OP (osteoporosis)   • Impingement syndrome, shoulder       Exam:  General Appearance:    68 y.o. female  cooperative, in no acute distress.  Alert and oriented x 3,                   Vitals:    18 1003   Weight: 97.1 kg (214 lb)   Height: 170.2 cm (67.01\")          Body mass index is 33.51 kg/m².   Examination of the right hip reveals a well-healed incision.  She is good motion without any instability.  She has markedly tenderness along the greater trochanteric bursa.  Negative straight leg raise.  Her neurovascular status is intact.    Radiology:   3 views of the right hip were reviewed revealing prosthesis in good alignment and position.  No acute fracture or dislocation seen.    Large Joint Arthrocentesis  Date/Time: 2018 11:18 AM  Consent given by: patient  Timeout: Immediately prior to procedure a time out was called to verify the correct patient, procedure, equipment, support staff and site/side marked as required   Supporting Documentation  Indications: pain   Procedure Details  Location: hip - R greater trochanteric bursa  Needle size: 25 G  Approach: lateral  Medications administered: 40 mg methylPREDNISolone acetate 40 MG/ML; 2 mL lidocaine " 2%  Patient tolerance: patient tolerated the procedure well with no immediate complications          Assessment    ICD-10-CM ICD-9-CM   1. Trochanteric bursitis of right hip M70.61 726.5       Plan:   A 68-year-old female with clinical findings consistent with greater trochanteric bursitis of the right hip.  We reviewed x-rays today revealing prosthesis in good alignment and position.  Given the point tenderness today we proceeded with 40 mg of Depo-Medrol with lidocaine block into the right greater trochanteric bursa.  She tolerated the procedure well.  We will monitor her response the injection and she will return back with any recurrence of pain.    Written by, Nayely TOLBERT, acting as a scribe for Dr. Christie

## 2018-06-19 RX ORDER — METHYLPREDNISOLONE ACETATE 40 MG/ML
40 INJECTION, SUSPENSION INTRA-ARTICULAR; INTRALESIONAL; INTRAMUSCULAR; SOFT TISSUE
Status: COMPLETED | OUTPATIENT
Start: 2018-06-13 | End: 2018-06-13

## 2018-06-19 RX ORDER — LIDOCAINE HYDROCHLORIDE 20 MG/ML
2 INJECTION, SOLUTION INFILTRATION; PERINEURAL
Status: COMPLETED | OUTPATIENT
Start: 2018-06-13 | End: 2018-06-13

## 2022-05-05 NOTE — PROGRESS NOTES
Follow-up Visit         Patient: Gayathri Infante  YOB: 1950  Date of Encounter: 05/07/2018      HPI:  Gayathri Infante, 68 y.o. female seen today in follow up for left frozen shoulder. She has completed 6 weeks of physical therapy and has a home pulley system and bands. She reports improvement in her pain.     Medical History:  Patient Active Problem List   Diagnosis   • Gout   • HLD (hyperlipidemia)   • BP (high blood pressure)   • Arthritis of knee, degenerative   • OP (osteoporosis)   • Impingement syndrome, shoulder     Past Medical History:   Diagnosis Date   • Arthritis    • Frequency of urination    • Gout    • Heartburn    • High cholesterol    • Hypertension    • Osteoporosis        Social History:  Social History     Social History   • Marital status:      Spouse name: N/A   • Number of children: N/A   • Years of education: N/A     Occupational History   • Not on file.     Social History Main Topics   • Smoking status: Former Smoker     Packs/day: 1.00     Years: 30.00     Types: Cigarettes     Quit date: 7/25/1999   • Smokeless tobacco: Never Used   • Alcohol use No   • Drug use: No   • Sexual activity: Defer     Other Topics Concern   • Not on file     Social History Narrative   • No narrative on file       Surgical History:  Past Surgical History:   Procedure Laterality Date   • ABDOMINAL SURGERY     • APPENDECTOMY     • CHOLECYSTECTOMY     • COLONOSCOPY     • DENTAL PROCEDURE     • JOINT REPLACEMENT Bilateral     hips   • LAPAROSCOPIC TUBAL LIGATION     • OOPHORECTOMY Right    • SHOULDER ARTHROSCOPY Left 7/27/2017    Procedure: LEFT SHOULDER ARTHROSCOPY W/ MINI OPEN ROTATOR CUFF REPAIR,ACROMIOPLAST;  Surgeon: Julio César Christie MD;  Location: [facility];  Service:    • TONSILLECTOMY     • TOTAL HIP ARTHROPLASTY      both       Examination:  Left Shoulder:   Good strength with external rotation. Good strength with shoulder abduction and only slightly diminished compared to the  right.  She continues to have internal rotation to L5.  She can forward elevate to 130°.  Neurovascular grossly intact.                Assessment:   68 y.o. female with left frozen shoulder holding open rotator cuff repair acromioplasty., now approximately 9 months post-op from rotator cuff repair July 2017.  We will continue with aggressive range of motion and strengthening to her arm.  He is referred again to physical therapy to attend at least one session per week.     Diagnosis Plan   1. Adhesive capsulitis of left shoulder  Ambulatory Referral to Physical Therapy Evaluate and treat; ROM (left shoulder), Strengthening, Stretching; 12       Plan:  Today she is referred to physical therapy for continued ROM and progression to home program. She will follow up in 3 months.       Cc:  Catarino Peters MD    Scribed for Julio César Christie MD by Geena Christie RN.1:08 PM 05/07/2018     Discontinue Regimen: Equate body wash Detail Level: Zone Initiate Treatment: Triamcinolone bid to the affected area for two weeks\\nCan use on areas affected by eczema should they flare Otc Regimen: Initiate vanicream line of fragrance free products

## (undated) DEVICE — TBG PUMP ARTHSCP MAIN AR6400 16FT

## (undated) DEVICE — SYR LUERLOK 50ML

## (undated) DEVICE — NDL SPINE 22G 31/2IN BLK

## (undated) DEVICE — INTENDED FOR TISSUE SEPARATION, AND OTHER PROCEDURES THAT REQUIRE A SHARP SURGICAL BLADE TO PUNCTURE OR CUT.: Brand: BARD-PARKER ® STAINLESS STEEL BLADES

## (undated) DEVICE — SYR LUERLOK 30CC

## (undated) DEVICE — 4-PORT MANIFOLD: Brand: NEPTUNE 2

## (undated) DEVICE — SUT ETHLN 3/0 FS1 663G

## (undated) DEVICE — DRSNG WND STRIP OPTIFOAM AG A/MIC LF 3.5X6IN STRL

## (undated) DEVICE — ENCORE® LATEX MICRO SIZE 7.5, STERILE LATEX POWDER-FREE SURGICAL GLOVE: Brand: ENCORE

## (undated) DEVICE — BNDG ADHS CURAD FLX/FABRC 2X4IN STRL LF

## (undated) DEVICE — PK SHLDR 70

## (undated) DEVICE — MAT FLR ABSORBENT LG 4FT 10 2.5FT

## (undated) DEVICE — CARBIDE BUR, OVAL CUTTING, 10 FLUTES, LONG, 5.5MM DI: Brand: MICROAIRE®

## (undated) DEVICE — OSCILLATING SAW BLADE, 9MM X 24.6MM X 0.64MM: Brand: MICROAIRE®

## (undated) DEVICE — WRP COMPR SHLDR COLD UNIV

## (undated) DEVICE — NDL SPINE 17G 3 1/2IN TUOHY

## (undated) DEVICE — NDL HYPO ECLPS SFTY 22G 1 1/2IN

## (undated) DEVICE — HOLDER: Brand: DEROYAL